# Patient Record
Sex: FEMALE | Race: WHITE | Employment: OTHER | URBAN - METROPOLITAN AREA
[De-identification: names, ages, dates, MRNs, and addresses within clinical notes are randomized per-mention and may not be internally consistent; named-entity substitution may affect disease eponyms.]

---

## 2019-02-08 ENCOUNTER — HOSPITAL ENCOUNTER (OUTPATIENT)
Age: 64
Setting detail: OUTPATIENT SURGERY
Discharge: HOME | End: 2019-02-08
Attending: INTERNAL MEDICINE | Admitting: INTERNAL MEDICINE
Payer: MEDICARE

## 2019-02-08 ENCOUNTER — ANESTHESIA EVENT (OUTPATIENT)
Dept: ENDOSCOPY | Age: 64
End: 2019-02-08
Payer: MEDICARE

## 2019-02-08 ENCOUNTER — ANESTHESIA (OUTPATIENT)
Dept: ENDOSCOPY | Age: 64
End: 2019-02-08
Payer: MEDICARE

## 2019-02-08 VITALS
TEMPERATURE: 96.8 F | HEART RATE: 74 BPM | OXYGEN SATURATION: 100 % | RESPIRATION RATE: 10 BRPM | SYSTOLIC BLOOD PRESSURE: 118 MMHG | DIASTOLIC BLOOD PRESSURE: 75 MMHG

## 2019-02-08 PROCEDURE — 74011250636 HC RX REV CODE- 250/636

## 2019-02-08 PROCEDURE — 76060000032 HC ANESTHESIA 0.5 TO 1 HR: Performed by: INTERNAL MEDICINE

## 2019-02-08 PROCEDURE — 76040000007: Performed by: INTERNAL MEDICINE

## 2019-02-08 PROCEDURE — 74011250637 HC RX REV CODE- 250/637: Performed by: INTERNAL MEDICINE

## 2019-02-08 PROCEDURE — 77030027957 HC TBNG IRR ENDOGTR BUSS -B: Performed by: INTERNAL MEDICINE

## 2019-02-08 RX ORDER — FENTANYL CITRATE 50 UG/ML
100 INJECTION, SOLUTION INTRAMUSCULAR; INTRAVENOUS
Status: DISCONTINUED | OUTPATIENT
Start: 2019-02-08 | End: 2019-02-08 | Stop reason: HOSPADM

## 2019-02-08 RX ORDER — MIDAZOLAM HYDROCHLORIDE 1 MG/ML
.25-1 INJECTION, SOLUTION INTRAMUSCULAR; INTRAVENOUS
Status: ACTIVE | OUTPATIENT
Start: 2019-02-08 | End: 2019-02-08

## 2019-02-08 RX ORDER — SODIUM CHLORIDE 0.9 % (FLUSH) 0.9 %
5-40 SYRINGE (ML) INJECTION AS NEEDED
Status: DISCONTINUED | OUTPATIENT
Start: 2019-02-08 | End: 2019-02-08 | Stop reason: HOSPADM

## 2019-02-08 RX ORDER — RANITIDINE 150 MG/1
150 TABLET, FILM COATED ORAL 2 TIMES DAILY
Status: ON HOLD | COMMUNITY
End: 2021-08-12

## 2019-02-08 RX ORDER — ROSUVASTATIN CALCIUM 10 MG/1
10 TABLET, COATED ORAL
COMMUNITY

## 2019-02-08 RX ORDER — DEXTROMETHORPHAN/PSEUDOEPHED 2.5-7.5/.8
1.2 DROPS ORAL
Status: DISCONTINUED | OUTPATIENT
Start: 2019-02-08 | End: 2019-02-08 | Stop reason: HOSPADM

## 2019-02-08 RX ORDER — ASPIRIN 81 MG/1
TABLET ORAL DAILY
COMMUNITY

## 2019-02-08 RX ORDER — LIDOCAINE HYDROCHLORIDE 20 MG/ML
INJECTION, SOLUTION EPIDURAL; INFILTRATION; INTRACAUDAL; PERINEURAL AS NEEDED
Status: DISCONTINUED | OUTPATIENT
Start: 2019-02-08 | End: 2019-02-08 | Stop reason: HOSPADM

## 2019-02-08 RX ORDER — SODIUM CHLORIDE 9 MG/ML
INJECTION, SOLUTION INTRAVENOUS
Status: DISCONTINUED | OUTPATIENT
Start: 2019-02-08 | End: 2019-02-08 | Stop reason: HOSPADM

## 2019-02-08 RX ORDER — ATROPINE SULFATE 0.1 MG/ML
0.5 INJECTION INTRAVENOUS
Status: DISCONTINUED | OUTPATIENT
Start: 2019-02-08 | End: 2019-02-08 | Stop reason: HOSPADM

## 2019-02-08 RX ORDER — NALOXONE HYDROCHLORIDE 0.4 MG/ML
0.4 INJECTION, SOLUTION INTRAMUSCULAR; INTRAVENOUS; SUBCUTANEOUS
Status: ACTIVE | OUTPATIENT
Start: 2019-02-08 | End: 2019-02-08

## 2019-02-08 RX ORDER — EPINEPHRINE 0.1 MG/ML
1 INJECTION INTRACARDIAC; INTRAVENOUS
Status: DISCONTINUED | OUTPATIENT
Start: 2019-02-08 | End: 2019-02-08 | Stop reason: HOSPADM

## 2019-02-08 RX ORDER — SODIUM CHLORIDE 9 MG/ML
50 INJECTION, SOLUTION INTRAVENOUS CONTINUOUS
Status: DISPENSED | OUTPATIENT
Start: 2019-02-08 | End: 2019-02-08

## 2019-02-08 RX ORDER — INDAPAMIDE 1.25 MG/1
1.25 TABLET, FILM COATED ORAL DAILY
COMMUNITY

## 2019-02-08 RX ORDER — PHENYLEPHRINE HCL IN 0.9% NACL 0.4MG/10ML
SYRINGE (ML) INTRAVENOUS AS NEEDED
Status: DISCONTINUED | OUTPATIENT
Start: 2019-02-08 | End: 2019-02-08 | Stop reason: HOSPADM

## 2019-02-08 RX ORDER — FLUMAZENIL 0.1 MG/ML
0.2 INJECTION INTRAVENOUS
Status: ACTIVE | OUTPATIENT
Start: 2019-02-08 | End: 2019-02-08

## 2019-02-08 RX ORDER — PROPOFOL 10 MG/ML
INJECTION, EMULSION INTRAVENOUS AS NEEDED
Status: DISCONTINUED | OUTPATIENT
Start: 2019-02-08 | End: 2019-02-08 | Stop reason: HOSPADM

## 2019-02-08 RX ORDER — SODIUM CHLORIDE 0.9 % (FLUSH) 0.9 %
5-40 SYRINGE (ML) INJECTION EVERY 8 HOURS
Status: DISCONTINUED | OUTPATIENT
Start: 2019-02-08 | End: 2019-02-08 | Stop reason: HOSPADM

## 2019-02-08 RX ORDER — METHOTREXATE 2.5 MG/1
2.5 TABLET ORAL
COMMUNITY

## 2019-02-08 RX ORDER — FOLIC ACID 1 MG/1
TABLET ORAL DAILY
COMMUNITY

## 2019-02-08 RX ADMIN — Medication 80 MCG: at 12:39

## 2019-02-08 RX ADMIN — PROPOFOL 50 MG: 10 INJECTION, EMULSION INTRAVENOUS at 12:18

## 2019-02-08 RX ADMIN — Medication 80 MCG: at 12:34

## 2019-02-08 RX ADMIN — PROPOFOL 50 MG: 10 INJECTION, EMULSION INTRAVENOUS at 12:22

## 2019-02-08 RX ADMIN — LIDOCAINE HYDROCHLORIDE 60 MG: 20 INJECTION, SOLUTION EPIDURAL; INFILTRATION; INTRACAUDAL; PERINEURAL at 12:09

## 2019-02-08 RX ADMIN — SODIUM CHLORIDE: 9 INJECTION, SOLUTION INTRAVENOUS at 12:06

## 2019-02-08 RX ADMIN — PROPOFOL 100 MG: 10 INJECTION, EMULSION INTRAVENOUS at 12:14

## 2019-02-08 RX ADMIN — PROPOFOL 20 MG: 10 INJECTION, EMULSION INTRAVENOUS at 12:39

## 2019-02-08 RX ADMIN — PROPOFOL 40 MG: 10 INJECTION, EMULSION INTRAVENOUS at 12:37

## 2019-02-08 RX ADMIN — PROPOFOL 50 MG: 10 INJECTION, EMULSION INTRAVENOUS at 12:07

## 2019-02-08 RX ADMIN — PROPOFOL 50 MG: 10 INJECTION, EMULSION INTRAVENOUS at 12:27

## 2019-02-08 RX ADMIN — PROPOFOL 30 MG: 10 INJECTION, EMULSION INTRAVENOUS at 12:32

## 2019-02-08 RX ADMIN — PROPOFOL 50 MG: 10 INJECTION, EMULSION INTRAVENOUS at 12:20

## 2019-02-08 RX ADMIN — PROPOFOL 30 MG: 10 INJECTION, EMULSION INTRAVENOUS at 12:25

## 2019-02-08 RX ADMIN — PROPOFOL 50 MG: 10 INJECTION, EMULSION INTRAVENOUS at 12:10

## 2019-02-08 RX ADMIN — PROPOFOL 30 MG: 10 INJECTION, EMULSION INTRAVENOUS at 12:33

## 2019-02-08 NOTE — PROGRESS NOTES

## 2019-02-08 NOTE — DISCHARGE INSTRUCTIONS
118 [facility].  [address]astelum [facility]  [id number]  1955    It was my pleasure seeing you for your procedure. You will also receive a summary report with the findings from this procedure and any further recommendations. If you had polyps removed or biopsies taken during your procedure, you will receive a separate letter from me within the next 2 weeks. If you don't receive this letter or if you have any questions, please call my office [phone]. Please take note of the post procedure instructions listed below. Mark Anthony Lynch,    Dr. Mari Contreras    These instructions give you information on caring for yourself after your procedure. Call your doctor if you have any problems or questions after your procedure. HOME CARE  · Walk if you have belly cramping or gas. Walking will help get rid of the air and reduce the bloated feeling in your belly (abdomen). · Your IV site (where you received drugs) may be tender to touch. Place warm towels on the site; keep your arm up on two pillows if you have any swelling or soreness in the area. · You may shower. ACTIVITY:  · Take frequent rest periods and move at a slower pace for the next 24 hours. .  · You may resume your regular activity tomorrow if you are feeling back to normal.  · Do not drive or ride a bicycle for at least 24 hours (because of the medicine (anesthesia) used during the test). · Do not sign any important legal documents or use or operate any machinery for 24 hours  · Do not take sleeping medicines/nerve drugs for 24 hours unless the doctor tells you. · You can return to work/school tomorrow unless otherwise instructed. NUTRITION:  · Drink plenty of fluids to keep your pee (urine) clear or pale yellow  · Begin with a light meal and progress to your normal diet.  Heavy or fried foods are harder to digest and may make you feel sick to your stomach (nauseated). · Once you are feeling back to normal, you may resume your normal diet as instructed by your doctor. · Avoid alcoholic beverages for 24 hours or as instructed. IF YOU HAD BIOPSIES TAKEN OR POLYPS REMOVED DURING THE PROCEDURE:  · For the next 7 days, avoid all non-steroidal antiinflammatory medications such as Ibuprofen, Motrin, Advil, Alleve, Marlene-seltzer, Goody's powder, BC powder. · If you do not have an heart condition that requires you to take a daily aspirin, you should avoid taking aspirin for 7 days. · Eat a soft diet for 24 hours. · Monitor your stools for any blood or dark black, tar-like, stools as this may be a sign of bleeding and if you see any blood, notify your doctor immediately. GET HELP RIGHT AWAY AND SEEK IMMEDIATE MEDICAL CARE IF:  · You have more than a spotting of blood in your stool. · You pass clumps of tissue (blood clots) or fill the toilet with blood. · Your belly is painfully swollen or puffy (abdominal distention). · You throw up (vomit). · You have a fever. · You have redness, pain or swelling at the IV site that last greater than two days. · You have abdominal pain or discomfort that is severe or gets worse throughout the day. Post-procedure diagnosis:    1. Grade C Esophagitis   2. Hiatal Hernia  3. Mild Sigmoid Diverticulosis    EGD Findings:  Esophagus:Grade C esophagitis in the distal esophagus with associated small non-obstructing ring at the GE junction (38 cm from the incisors), 2 cm sliding hiatal hernia  Stomach:normal   Duodenum/jejunum:normal    Colon Findings:   Rectum: normal  Sigmoid: few small diverticula, slightly tortous, overcome with manual pressure  Descending Colon: normal  Transverse Colon: normal  Ascending Colon: normal  Cecum: normal    Recommendations:   - Resume normal medications.  Stop ranitidine and start omeprazole 40 mg twice daily prior to meals.   - Repeat EGD in 12 weeks to assess healing.   - Recommend repeat colonoscopy in 5 years assuming no personal history of prior polyps. Patient Education   Patient Education        Hiatal Hernia: Care Instructions  Your Care Instructions  A hiatal hernia occurs when part of the stomach bulges into the chest cavity. A hiatal hernia may allow stomach acid and juices to back up into the esophagus (acid reflux). This can cause a feeling of burning, warmth, heat, or pain behind the breastbone. This feeling may often occur after you eat, soon after you lie down, or when you bend forward, and it may come and go. You also may have a sour taste in your mouth. These symptoms are commonly known as heartburn or reflux. But not all hiatal hernias cause symptoms. Follow-up care is a key part of your treatment and safety. Be sure to make and go to all appointments, and call your doctor if you are having problems. It's also a good idea to know your test results and keep a list of the medicines you take. How can you care for yourself at home? · Take your medicines exactly as prescribed. Call your doctor if you think you are having a problem with your medicine. · Do not take aspirin or other nonsteroidal anti-inflammatory drugs (NSAIDs), such as ibuprofen (Advil, Motrin) or naproxen (Aleve), unless your doctor says it is okay. Ask your doctor what you can take for pain. · Your doctor may recommend over-the-counter medicine. For mild or occasional indigestion, antacids such as Tums, Gaviscon, Maalox, or Mylanta may help. Your doctor also may recommend over-the-counter acid reducers, such as famotidine (Pepcid AC), cimetidine (Tagamet HB), ranitidine (Zantac 75 and Zantac 150), or omeprazole (Prilosec). Read and follow all instructions on the label. If you use these medicines often, talk with your doctor. · Change your eating habits. ? It's best to eat several small meals instead of two or three large meals. ?  After you eat, wait 2 to 3 hours before you lie down. Late-night snacks aren't a good idea. ? Chocolate, mint, and alcohol can make heartburn worse. They relax the valve between the esophagus and the stomach. ? Spicy foods, foods that have a lot of acid (like tomatoes and oranges), and coffee can make heartburn symptoms worse in some people. If your symptoms are worse after you eat a certain food, you may want to stop eating that food to see if your symptoms get better. · Do not smoke or chew tobacco.  · If you get heartburn at night, raise the head of your bed 6 to 8 inches by putting the frame on blocks or placing a foam wedge under the head of your mattress. (Adding extra pillows does not work.)  · Do not wear tight clothing around your middle. · Lose weight if you need to. Losing just 5 to 10 pounds can help. When should you call for help? Call your doctor now or seek immediate medical care if:    · You have new or worse belly pain.     · You are vomiting.    Watch closely for changes in your health, and be sure to contact your doctor if:    · You have new or worse symptoms of indigestion.     · You have trouble or pain swallowing.     · You are losing weight.     · You do not get better as expected. Where can you learn more? Go to http://amaya-crissy.info/. Enter L592 in the search box to learn more about \"Hiatal Hernia: Care Instructions. \"  Current as of: March 27, 2018  Content Version: 11.9  © 2636-6723 [email or web address]. Care instructions adapted under license by [employer] (which disclaims liability or warranty for this information). If you have questions about a medical condition or this instruction, always ask your healthcare professional. [address] any warranty or liability for your use of this information. Diverticulosis: Care Instructions  Your Care Instructions  In diverticulosis, pouches called diverticula form in the wall of the large intestine (colon). The pouches do not cause any pain or other symptoms. Most people who have diverticulosis do not know they have it. But the pouches sometimes bleed, and if they become infected, they can cause pain and other symptoms. When this happens, it is called diverticulitis. Diverticula form when pressure pushes the wall of the colon outward at certain weak points. A diet that is too low in fiber can cause diverticula. Follow-up care is a key part of your treatment and safety. Be sure to make and go to all appointments, and call your doctor if you are having problems. It's also a good idea to know your test results and keep a list of the medicines you take. How can you care for yourself at home? · Include fruits, leafy green vegetables, beans, and whole grains in your diet each day. These foods are high in fiber. · Take a fiber supplement, such as Citrucel or Metamucil, every day if needed. Read and follow all instructions on the label. · Drink plenty of fluids, enough so that your urine is light yellow or clear like water. If you have kidney, heart, or liver disease and have to limit fluids, talk with your doctor before you increase the amount of fluids you drink. · Get at least 30 minutes of exercise on most days of the week. Walking is a good choice. You also may want to do other activities, such as running, swimming, cycling, or playing tennis or team sports. · Cut out foods that cause gas, pain, or other symptoms. When should you call for help?   Call your doctor now or seek immediate medical care if:    · You have belly pain.     · You pass maroon or very bloody stools.     · You have a fever.     · You have nausea and vomiting.     · You have unusual changes in your bowel movements or abdominal swelling.     · You have burning pain when you urinate.     · You have abnormal vaginal discharge.     · You have shoulder pain.     · You have cramping pain that does not get better when you have a bowel movement or pass gas.     · You pass gas or stool from your urethra while urinating.    Watch closely for changes in your health, and be sure to contact your doctor if you have any problems. Where can you learn more? Go to http://amaya-crissy.info/. Enter Y096 in the search box to learn more about \"Diverticulosis: Care Instructions. \"  Current as of: March 27, 2018  Content Version: 11.9  © 2784-5977 [employer]. Care instructions adapted under license by [employer] (which disclaims liability or warranty for this information). If you have questions about a medical condition or this instruction, always ask your healthcare professional. [address] any warranty or liability for your use of this information.

## 2019-02-08 NOTE — ANESTHESIA PREPROCEDURE EVALUATION
Anesthetic History No history of anesthetic complications Review of Systems / Medical History Patient summary reviewed, nursing notes reviewed and pertinent labs reviewed Pulmonary Within defined limits Neuro/Psych Within defined limits Cardiovascular Within defined limits Hypertension GI/Hepatic/Renal 
Within defined limits GERD Endo/Other Within defined limits Arthritis Other Findings Comments: Raynauds Physical Exam 
 
Airway Mallampati: II 
TM Distance: > 6 cm Neck ROM: normal range of motion Mouth opening: Normal 
 
 Cardiovascular Regular rate and rhythm,  S1 and S2 normal,  no murmur, click, rub, or gallop Dental 
No notable dental hx Pulmonary Breath sounds clear to auscultation Abdominal 
GI exam deferred Other Findings Anesthetic Plan ASA: 2 Anesthesia type: MAC Anesthetic plan and risks discussed with: Patient

## 2019-02-08 NOTE — H&P
[address] [facility] [address]  [address]  [place], [address]  [phone]                                History and Physical     NAME: Lena Tripp   :  1955   MRN:  [id number]     HPI:  The patient was seen and examined. No past surgical history on file. No past medical history on file. Social History     Tobacco Use    Smoking status: Not on file   Substance Use Topics    Alcohol use: Not on file    Drug use: Not on file     Not on File  No family history on file. No current facility-administered medications for this encounter. PHYSICAL EXAM:  General: WD, WN. Alert, cooperative, no acute distress    HEENT: NC, Atraumatic. PERRLA, EOMI. Anicteric sclerae. Lungs:  CTA Bilaterally. No Wheezing/Rhonchi/Rales. Heart:  Regular  rhythm,  No murmur, No Rubs, No Gallops  Abdomen: Soft, Non distended, Non tender.  +Bowel sounds, no HSM  Extremities: No c/c/e  Neurologic:  CN 2-12 gi, Alert and oriented X 3. No acute neurological distress   Psych:   Good insight. Not anxious nor agitated. The heart, lungs and mental status were satisfactory for the administration of MAC sedation and for the procedure.       Mallampati score: 2       Assessment:   · GERD, screening colonoscopy    Plan:   · Endoscopic procedure : egd w bravo, cscope  · MAC sedation

## 2019-02-08 NOTE — ROUTINE PROCESS
Chastity Brenda  1955  [id number]    Situation:  Verbal report received from: Sita  Procedure: Procedure(s):  ESOPHAGOGASTRODUODENOSCOPY (EGD)  COLONOSCOPY    Background:    Preoperative diagnosis: Reflux  Screening  Postoperative diagnosis: 1. Grade C Esophagitis   2. Hiatal Hernia  3. Mild Sigmoid Diverticulosis    :  Dr. Isis Sol  Assistant(s): Endoscopy Technician-1: Jordan Antonio  Endoscopy RN-1: Arthea Castleman, RN    Specimens: * No specimens in log *  H. Pylori  no    Assessment:  Intra-procedure medications     Anesthesia gave intra-procedure sedation and medications, see anesthesia flow sheet yes    Intravenous fluids: NS@ KVO     Vital signs stable yes    Abdominal assessment: round and soft yes    Recommendation:  Discharge patient per MD order yes .   Return to floor na   Family or Friend fam  Permission to share finding with family or friend yes

## 2019-02-08 NOTE — ANESTHESIA POSTPROCEDURE EVALUATION
Procedure(s): ESOPHAGOGASTRODUODENOSCOPY (EGD) COLONOSCOPY. Anesthesia Post Evaluation Multimodal analgesia: multimodal analgesia not used between 6 hours prior to anesthesia start to PACU discharge Patient location during evaluation: PACU Patient participation: complete - patient participated Level of consciousness: awake Pain score: 0 Pain management: adequate Airway patency: patent Anesthetic complications: no 
Cardiovascular status: acceptable Respiratory status: acceptable Hydration status: acceptable Comments: I have evaluated the patient and meets criteria for discharge from PACU. Skye Patel MD 
 
 
 
Visit Vitals BP 96/63 Pulse 74 Temp 36 °C (96.8 °F) Resp 11 SpO2 100%

## 2019-02-08 NOTE — PROCEDURES
118 [facility].  217 [facility] [address], [address]   [phone]                           Colonoscopy and EGD Procedure Note      Indications:  GERD, colon cancer screening (last colonoscopy > 5 years prior)     :  Jose Juan Norwood MD    Referring Provider: Alexandria Mckinley MD    Sedation:  MAC anesthesia    Procedure Details:  After informed consent was obtained with all risks and benefits of procedure explained and pre-operative exam completed, pt was placed in the left lateral decubitus position. Following sequential administration of sedation as per above, the gastroscope was inserted into the mouth and advanced under direct vision to second portion of the duodenum. A careful inspection was made as the gastroscope was withdrawn, including a retroflexed view of the proximal stomach; findings and interventions are described below. EGD Findings:  Esophagus:Grade C esophagitis in the distal esophagus with associated small non-obstructing ring at the GE junction (38 cm from the incisors), 2 cm sliding hiatal hernia  Stomach:normal   Duodenum/jejunum:normal    EGD Interventions:  none    The bed was then turned and upon sequential sedation as per above, a digital rectal exam was performed per below. The Olympus videocolonoscope was inserted in the rectum and carefully advanced to the cecum, which was identified by the ileocecal valve and appendiceal orifice. The quality of preparation was good. [place] Bowel Prep Score  3/3/3. The colonoscope was slowly withdrawn with careful evaluation between folds. Retroflexion in the rectum was performed. Colon Findings:   Rectum: normal  Sigmoid: few small diverticula, slightly tortous, overcome with manual pressure  Descending Colon: normal  Transverse Colon: normal  Ascending Colon: normal  Cecum: normal    Colonoscopy Interventions:  none           Specimens Removed:  * No specimens in log *    Complications: None.      EBL: None    Impression:    See Postoperative diagnosis above    Recommendations:   - Resume normal medications. Stop ranitidine and start omeprazole 40 mg twice daily prior to meals.   - Noting degree of esophagitis, defer bravo placement.   - Repeat EGD in 12 weeks to assess healing.   - Recommend repeat colonoscopy in 10 years assuming no personal history of prior polyps. Discharge Disposition:  Home in the company of a  when able to ambulate.     Bonita Mendez MD  2/8/2019  12:51 PM

## 2019-04-29 ENCOUNTER — ANESTHESIA (OUTPATIENT)
Dept: ENDOSCOPY | Age: 64
End: 2019-04-29
Payer: MEDICARE

## 2019-04-29 ENCOUNTER — HOSPITAL ENCOUNTER (OUTPATIENT)
Age: 64
Setting detail: OUTPATIENT SURGERY
Discharge: HOME | End: 2019-04-29
Attending: INTERNAL MEDICINE | Admitting: INTERNAL MEDICINE
Payer: MEDICARE

## 2019-04-29 ENCOUNTER — ANESTHESIA EVENT (OUTPATIENT)
Dept: ENDOSCOPY | Age: 64
End: 2019-04-29
Payer: MEDICARE

## 2019-04-29 VITALS
OXYGEN SATURATION: 96 % | DIASTOLIC BLOOD PRESSURE: 81 MMHG | TEMPERATURE: 98 F | RESPIRATION RATE: 16 BRPM | HEART RATE: 80 BPM | WEIGHT: 150 LBS | SYSTOLIC BLOOD PRESSURE: 112 MMHG

## 2019-04-29 PROCEDURE — 74011250636 HC RX REV CODE- 250/636

## 2019-04-29 PROCEDURE — 76060000031 HC ANESTHESIA FIRST 0.5 HR: Performed by: INTERNAL MEDICINE

## 2019-04-29 PROCEDURE — 76040000019: Performed by: INTERNAL MEDICINE

## 2019-04-29 RX ORDER — OMEPRAZOLE 40 MG/1
40 CAPSULE, DELAYED RELEASE ORAL 2 TIMES DAILY
Status: ON HOLD | COMMUNITY
End: 2021-09-13

## 2019-04-29 RX ORDER — ATROPINE SULFATE 0.1 MG/ML
0.5 INJECTION INTRAVENOUS
Status: DISCONTINUED | OUTPATIENT
Start: 2019-04-29 | End: 2019-04-29 | Stop reason: HOSPADM

## 2019-04-29 RX ORDER — PROPOFOL 10 MG/ML
INJECTION, EMULSION INTRAVENOUS AS NEEDED
Status: DISCONTINUED | OUTPATIENT
Start: 2019-04-29 | End: 2019-04-29 | Stop reason: HOSPADM

## 2019-04-29 RX ORDER — LIDOCAINE HYDROCHLORIDE 20 MG/ML
INJECTION, SOLUTION EPIDURAL; INFILTRATION; INTRACAUDAL; PERINEURAL AS NEEDED
Status: DISCONTINUED | OUTPATIENT
Start: 2019-04-29 | End: 2019-04-29 | Stop reason: HOSPADM

## 2019-04-29 RX ORDER — FLUMAZENIL 0.1 MG/ML
0.2 INJECTION INTRAVENOUS
Status: DISCONTINUED | OUTPATIENT
Start: 2019-04-29 | End: 2019-04-29 | Stop reason: HOSPADM

## 2019-04-29 RX ORDER — DEXTROMETHORPHAN/PSEUDOEPHED 2.5-7.5/.8
1.2 DROPS ORAL
Status: DISCONTINUED | OUTPATIENT
Start: 2019-04-29 | End: 2019-04-29 | Stop reason: HOSPADM

## 2019-04-29 RX ORDER — MIDAZOLAM HYDROCHLORIDE 1 MG/ML
.25-1 INJECTION, SOLUTION INTRAMUSCULAR; INTRAVENOUS
Status: DISCONTINUED | OUTPATIENT
Start: 2019-04-29 | End: 2019-04-29 | Stop reason: HOSPADM

## 2019-04-29 RX ORDER — FENTANYL CITRATE 50 UG/ML
100 INJECTION, SOLUTION INTRAMUSCULAR; INTRAVENOUS
Status: DISCONTINUED | OUTPATIENT
Start: 2019-04-29 | End: 2019-04-29 | Stop reason: HOSPADM

## 2019-04-29 RX ORDER — EPINEPHRINE 0.1 MG/ML
1 INJECTION INTRACARDIAC; INTRAVENOUS
Status: DISCONTINUED | OUTPATIENT
Start: 2019-04-29 | End: 2019-04-29 | Stop reason: HOSPADM

## 2019-04-29 RX ORDER — SODIUM CHLORIDE 9 MG/ML
INJECTION, SOLUTION INTRAVENOUS
Status: DISCONTINUED | OUTPATIENT
Start: 2019-04-29 | End: 2019-04-29 | Stop reason: HOSPADM

## 2019-04-29 RX ORDER — SODIUM CHLORIDE 9 MG/ML
50 INJECTION, SOLUTION INTRAVENOUS CONTINUOUS
Status: DISCONTINUED | OUTPATIENT
Start: 2019-04-29 | End: 2019-04-29 | Stop reason: HOSPADM

## 2019-04-29 RX ORDER — SODIUM CHLORIDE 0.9 % (FLUSH) 0.9 %
5-40 SYRINGE (ML) INJECTION AS NEEDED
Status: DISCONTINUED | OUTPATIENT
Start: 2019-04-29 | End: 2019-04-29 | Stop reason: HOSPADM

## 2019-04-29 RX ORDER — SODIUM CHLORIDE 0.9 % (FLUSH) 0.9 %
5-40 SYRINGE (ML) INJECTION EVERY 8 HOURS
Status: DISCONTINUED | OUTPATIENT
Start: 2019-04-29 | End: 2019-04-29 | Stop reason: HOSPADM

## 2019-04-29 RX ORDER — NALOXONE HYDROCHLORIDE 0.4 MG/ML
0.4 INJECTION, SOLUTION INTRAMUSCULAR; INTRAVENOUS; SUBCUTANEOUS
Status: DISCONTINUED | OUTPATIENT
Start: 2019-04-29 | End: 2019-04-29 | Stop reason: HOSPADM

## 2019-04-29 RX ADMIN — SODIUM CHLORIDE: 9 INJECTION, SOLUTION INTRAVENOUS at 07:35

## 2019-04-29 RX ADMIN — PROPOFOL 50 MG: 10 INJECTION, EMULSION INTRAVENOUS at 07:45

## 2019-04-29 RX ADMIN — LIDOCAINE HYDROCHLORIDE 40 MG: 20 INJECTION, SOLUTION EPIDURAL; INFILTRATION; INTRACAUDAL; PERINEURAL at 07:41

## 2019-04-29 RX ADMIN — PROPOFOL 50 MG: 10 INJECTION, EMULSION INTRAVENOUS at 07:43

## 2019-04-29 RX ADMIN — PROPOFOL 100 MG: 10 INJECTION, EMULSION INTRAVENOUS at 07:41

## 2019-04-29 NOTE — PROCEDURES
118 [facility]3].  217 [facility] [address], [address]  [phone]                            NAME:  Rhona Blackwell   :   1955   MRN:   [id number]     Date/Time:  2019 7:50 AM    Esophagogastroduodenoscopy (EGD) Procedure Note    :  Rosey Coleman MD    Referring Provider:  Girish Pike MD    Anethesia/Sedation:  MAC anesthesia    Procedure Details   After infomed consent was obtained for the procedure, with all risks and benefits of procedure explained the patient was taken to the endoscopy suite and placed in the left lateral decubitus position. Following sequential administration of sedation as per above, the gastroscope was inserted into the mouth and advanced under direct vision to second portion of the duodenum. A careful inspection was made as the gastroscope was withdrawn, including a retroflexed view of the proximal stomach; findings and interventions are described below. Findings:  Esophagus:normal esophageal mucosa, Z-line 35 cm from the incisors. Esophagitis is fully healed with healthy appearing underlying mucosa. 3 cm sliding hiatal hernia, 35-38 cm from the incisors. Stomach:normal   Duodenum/jejunum:normal    Interventions:  none           Specimens Removed:  * No specimens in log *    Complications: None. EBL:  None    Impression:    See Postoperative diagnosis above    Recommendations:   - Resume normal medications.  Will decrease omeprazole : will send in prescription for 20 mg BID, though recommend initial trial of omeprazole 20 mg once daily and monitor for symptoms    Discharge disposition:  Home in the company of  when able to ambulate    Rosey Coleman MD

## 2019-04-29 NOTE — PROGRESS NOTES

## 2019-04-29 NOTE — DISCHARGE INSTRUCTIONS
[address] [facility] [address]  [address]  [address] [place] [address] [address] Gastelum [facility]  [id number]  1955    It was my pleasure seeing you for your procedure. You will also receive a summary report with the findings from this procedure and any further recommendations. If you had polyps removed or biopsies taken during your procedure, you will receive a separate letter from me within the next 2 weeks. If you don't receive this letter or if you have any questions, please call my office [phone]. Please take note of the post procedure instructions listed below. Mark Anthony Lynch,    Dr. Juliane Youngblood    These instructions give you information on caring for yourself after your procedure. Call your doctor if you have any problems or questions after your procedure. HOME CARE  · Walk if you have belly cramping or gas. Walking will help get rid of the air and reduce the bloated feeling in your belly (abdomen). · Your IV site (where you received drugs) may be tender to touch. Place warm towels on the site; keep your arm up on two pillows if you have any swelling or soreness in the area. · You may shower. ACTIVITY:  · Take frequent rest periods and move at a slower pace for the next 24 hours. .  · You may resume your regular activity tomorrow if you are feeling back to normal.  · Do not drive or ride a bicycle for at least 24 hours (because of the medicine (anesthesia) used during the test). · Do not sign any important legal documents or use or operate any machinery for 24 hours  · Do not take sleeping medicines/nerve drugs for 24 hours unless the doctor tells you. · You can return to work/school tomorrow unless otherwise instructed. NUTRITION:  · Drink plenty of fluids to keep your pee (urine) clear or pale yellow  · Begin with a light meal and progress to your normal diet.  Heavy or fried foods are harder to digest and may make you feel sick to your stomach (nauseated). · Once you are feeling back to normal, you may resume your normal diet as instructed by your doctor. · Avoid alcoholic beverages for 24 hours or as instructed. IF YOU HAD BIOPSIES TAKEN OR POLYPS REMOVED DURING THE PROCEDURE:  · For the next 7 days, avoid all non-steroidal antiinflammatory medications such as Ibuprofen, Motrin, Advil, Alleve, Marlene-seltzer, Goody's powder, BC powder. · If you do not have an heart condition that requires you to take a daily aspirin, you should avoid taking aspirin for 7 days. · Eat a soft diet for 24 hours. · Monitor your stools for any blood or dark black, tar-like, stools as this may be a sign of bleeding and if you see any blood, notify your doctor immediately. GET HELP RIGHT AWAY AND SEEK IMMEDIATE MEDICAL CARE IF:  · You have more than a spotting of blood in your stool. · You pass clumps of tissue (blood clots) or fill the toilet with blood. · Your belly is painfully swollen or puffy (abdominal distention). · You throw up (vomit). · You have a fever. · You have redness, pain or swelling at the IV site that last greater than two days. · You have abdominal pain or discomfort that is severe or gets worse throughout the day. Post-procedure diagnosis:  hiatal hernia    Findings:  Esophagus:normal esophageal mucosa, Z-line 35 cm from the incisors. Esophagitis is fully healed with healthy appearing underlying mucosa. 3 cm sliding hiatal hernia, 35-38 cm from the incisors. Stomach:normal   Duodenum/jejunum:normal    Recommendations:   - Resume normal medications.  Will decrease omeprazole : will send in prescription for 20 mg BID, though recommend initial trial of omeprazole 20 mg once daily and monitor for symptoms

## 2019-04-29 NOTE — ANESTHESIA POSTPROCEDURE EVALUATION
Post-Anesthesia Evaluation and Assessment Patient: Moon Camilo MRN: [id number]  SSN: [id number] YOB: 1955  Age: 70 y.o. Sex: female I have evaluated the patient and they are stable and ready for discharge from the PACU. Cardiovascular Function/Vital Signs Visit Vitals /81 Pulse 80 Temp 36.7 °C (98 °F) Resp 16 Wt 68 kg (150 lb) SpO2 96% Patient is status post MAC anesthesia for Procedure(s): ESOPHAGOGASTRODUODENOSCOPY (EGD). Nausea/Vomiting: None Postoperative hydration reviewed and adequate. Pain: 
Pain Scale 1: Numeric (0 - 10) (04/29/19 0817) Pain Intensity 1: 0 (04/29/19 0817) Managed Neurological Status: At baseline Mental Status, Level of Consciousness: Alert and  oriented to person, place, and time Pulmonary Status:  
O2 Device: Room air (04/29/19 0817) Adequate oxygenation and airway patent Complications related to anesthesia: None Post-anesthesia assessment completed. No concerns Signed By: Sahra Villegas MD   
 April 29, 2019 Procedure(s): ESOPHAGOGASTRODUODENOSCOPY (EGD). MAC 
 
<BSHSIANPOST> Vitals Value Taken Time /81 4/29/2019  8:17 AM  
Temp 36.7 °C (98 °F) 4/29/2019  8:04 AM  
Pulse 87 4/29/2019  8:23 AM  
Resp 0 4/29/2019  8:49 AM  
SpO2 100 % 4/29/2019  8:23 AM  
Vitals shown include unvalidated device data.

## 2019-04-29 NOTE — H&P
118 [facility].  217 [address], [address]  [phone]                                History and Physical     NAME: Yomaira Whitaker   :  1955   MRN:  [id number]     HPI:  The patient was seen and examined. Past Surgical History:   Procedure Laterality Date    COLONOSCOPY N/A 2019    COLONOSCOPY performed by Betsy Mccoy MD at [facility] ENDOSCOPY     Past Medical History:   Diagnosis Date    GERD (gastroesophageal reflux disease)     Hyperlipidemia     Hypertension     Lema [facility] ([facility] [address] [facility]     Rheumatoid arthritis ([facility] [address] [facility]      Social History     Tobacco Use    Smoking status: Never Smoker    Smokeless tobacco: Never Used   Substance Use Topics    Alcohol use: Yes     Alcohol/week: 0.6 oz     Types: 1 Glasses of wine per week     Frequency: Never    Drug use: No     No Known Allergies  History reviewed. No pertinent family history. No current facility-administered medications for this encounter. PHYSICAL EXAM:  General: WD, WN. Alert, cooperative, no acute distress    HEENT: NC, Atraumatic. PERRLA, EOMI. Anicteric sclerae. Lungs:  CTA Bilaterally. No Wheezing/Rhonchi/Rales. Heart:  Regular  rhythm,  No murmur, No Rubs, No Gallops  Abdomen: Soft, Non distended, Non tender.  +Bowel sounds, no HSM  Extremities: No c/c/e  Neurologic:  CN 2-12 gi, Alert and oriented X 3. No acute neurological distress   Psych:   Good insight. Not anxious nor agitated. The heart, lungs and mental status were satisfactory for the administration of MAC sedation and for the procedure.       Mallampati score: 2       Assessment:   · Follow up esophagitis    Plan:   · Endoscopic procedure :egd  · MAC sedation

## 2021-08-03 NOTE — PERIOP NOTES
Patient denied any COVID-19 symptoms or possible exposure that would require a pre-procedural COVID-19 test for the Endoscopy procedure scheduled on 8/12/21. Pt states she has been fully vaccinated (>2 weeks) and will bring card on 8/12/21.

## 2021-08-12 ENCOUNTER — HOSPITAL ENCOUNTER (OUTPATIENT)
Age: 66
Setting detail: OUTPATIENT SURGERY
Discharge: HOME | End: 2021-08-12
Attending: INTERNAL MEDICINE | Admitting: INTERNAL MEDICINE
Payer: MEDICARE

## 2021-08-12 VITALS
SYSTOLIC BLOOD PRESSURE: 176 MMHG | DIASTOLIC BLOOD PRESSURE: 97 MMHG | HEART RATE: 91 BPM | OXYGEN SATURATION: 100 % | BODY MASS INDEX: 24.21 KG/M2 | RESPIRATION RATE: 16 BRPM | HEIGHT: 66 IN

## 2021-08-12 PROCEDURE — 77030007009 HC CATH PH VRSFLX ALPN -C: Performed by: INTERNAL MEDICINE

## 2021-08-12 PROCEDURE — 2709999900 HC NON-CHARGEABLE SUPPLY: Performed by: INTERNAL MEDICINE

## 2021-08-12 PROCEDURE — 76040000008: Performed by: INTERNAL MEDICINE

## 2021-08-12 RX ORDER — CHOLECALCIFEROL (VITAMIN D3) 50 MCG
CAPSULE ORAL
COMMUNITY

## 2021-08-12 RX ORDER — LIDOCAINE HYDROCHLORIDE 20 MG/ML
JELLY TOPICAL ONCE
Status: DISCONTINUED | OUTPATIENT
Start: 2021-08-12 | End: 2021-08-12 | Stop reason: HOSPADM

## 2021-08-12 RX ORDER — ZINC GLUCONATE 10 MG
LOZENGE ORAL
COMMUNITY

## 2021-08-12 NOTE — DISCHARGE INSTRUCTIONS
Karis Ulloa  [id number]  1955      MANOMETRY DISCHARGE INSTRUCTION    You may resume your regular diet as tolerated. You may resume your normal daily activities. If you develop a sore throat- throat lozenges or warm salt water gargles will help. Call your Physician if you have any complications or questions. Karis Ulloa  [id number]  1955    24 HOUR PH MONITORING DISCHARGE INSTRUCTIONS    Please return to [facility] Endoscopy department at 11:00 tomorrow with your completed diary. ACTIVITY:  Avoid any activity that may get the data recorder wet. You may resume your normal daily activities. DIET:  You may resume your normal diet, HOWEVER avoid peanut butter and carbonated beverages for the next 24 hours. MEDICATIONS:  You may resume your normal medications with the exception of antacids    PLEASE Alon Zhang RN  AT  Office [phone] OR Pager [phone] IF YOU HAVE QUESTIONS, CONCERNS, OR TECHNICAL DIFFICULTIES DURING YOUR TEST. [employer] Activation    Thank you for requesting access to [employer]. Please follow the instructions below to securely access and download your online medical record. [employer] allows you to send messages to your doctor, view your test results, renew your prescriptions, schedule appointments, and more. How Do I Sign Up? 1. In your internet browser, go to [email or web address]  2. Click on the First Time User? Click Here link in the Sign In box. You will be redirect to the New Member Sign Up page. 3. Enter your [employer] Access Code exactly as it appears below. You will not need to use this code after youve completed the sign-up process. If you do not sign up before the expiration date, you must request a new code. [employer] Access Code: [email or web address]  Expires: 9/10/2021  9:57 AM (This is the date your [employer] access code will )    4.  Enter the last four digits of your Social Security Number (xxxx) and Date of Birth (aris/lino/yyyy) as indicated and click Submit. You will be taken to the next sign-up page. 5. Create a [employer] ID. This will be your [employer] login ID and cannot be changed, so think of one that is secure and easy to remember. 6. Create a [employer] password. You can change your password at any time. 7. Enter your Password Reset Question and Answer. This can be used at a later time if you forget your password. 8. Enter your e-mail address. You will receive e-mail notification when new information is available in [address] 9. Click Sign Up. You can now view and download portions of your medical record. 10. Click the Download Summary menu link to download a portable copy of your medical information. Additional Information    If you have questions, please visit the Frequently Asked Questions section of the [employer] website at [email or web address] Remember, [employer] is NOT to be used for urgent needs. For medical emergencies, dial 911.

## 2021-08-12 NOTE — PROGRESS NOTES
5cc viscous lidocaine inhaled into bilateral nare per MD orders. Probe inserted into  bilateral nare without difficulty. Pt tolerated procedure well. PH inserted into right nare 5 cms proximal to the LES without difficulty. Pt tolerated well. Data recorder activated and recording. Pt given diary and instructions for use of recorder as well as contact information for assistance as needed.

## 2021-08-12 NOTE — PROGRESS NOTES
Patient hypertensive. States BP at home was 108/76. Will continue with procedure, monitor patient, and recheck BP post-procedure.

## 2021-08-23 NOTE — PROCEDURES
Esophageal High-Resolution Manometry Report Summary     Date HRM Performed: 8/12/21  Referring physician: Self  Indication: GERD, esophagitis   ? PROCEDURE:  A solid-state recording assembly comprised of 36 circumferential pressure sensors spaced at 1 cm intervals was placed transnasally into the esophagus and positioned through the EGJ. The patient was positioned in the supine position. Mean EGJ junction pressures were measured during a 30-second baseline recording during which the patient was instructed to minimize swallowing. Contractility and pressurization pattern was assessed during ten 5-ml water swallows in the supine position at least 20-30 seconds apart, to generate the [facility] Classification diagnosis. Moreover, multiple rapid swallows (5 2-mL water swallows <3 seconds apart) were performed. ? RESULTS:   Assembly traversed diaphragm? Yes   Location of proximal border of LES: 42.7 cm  EGJ morphology: Type II   LES-CD separation: 2 cm   End-expiratory LESP: 12.9 mm Hg (nl 4.8-32.0 mm Hg)  Mid-respiratory LESP: 18.2 mm Hg (nl 13-43 mm Hg)  Mean IRP: 1.0 mm Hg (nl <15 mm Hg)  Mean DCI: 139.7 mm Hg-cm-sec (nl 450-8000)  Swallows: 6 failed (DCI <100 Hg-cm-sec) and 4 weak (100-450 Hg-cm-sec)  10/10 swallows with incomplete transit seen on impedance. MRS with augmentation is not intact. ?  IMPRESSION:  EGJ: The EGJ morphology is consistent with type II and is hyponormotensive. There is evidence of normal EGJ outflow   pressures based on IRP. Esophageal body: The contractile pattern is consistent with ineffective peristalsis/contractility. 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 ADDENDUM: 24 hr Ph monitoring reveals Deemester score 85. 6. 31 total reflux episodes, Longest reflux 50 minutes. 22.8% of time with acid exposure time (5:17). SAP 98.9 for cough. This is consistent with abnormal/increased acid exposure.

## 2021-09-09 ENCOUNTER — TRANSCRIBE ORDER (OUTPATIENT)
Dept: REGISTRATION | Age: 66
End: 2021-09-09

## 2021-09-09 ENCOUNTER — HOSPITAL ENCOUNTER (OUTPATIENT)
Dept: PREADMISSION TESTING | Age: 66
Discharge: HOME | End: 2021-09-09
Payer: MEDICARE

## 2021-09-09 DIAGNOSIS — Z01.812 PRE-PROCEDURE LAB EXAM: Primary | ICD-10-CM

## 2021-09-09 DIAGNOSIS — Z01.812 PRE-PROCEDURE LAB EXAM: ICD-10-CM

## 2021-09-09 PROCEDURE — U0003 INFECTIOUS AGENT DETECTION BY NUCLEIC ACID (DNA OR RNA); SEVERE ACUTE RESPIRATORY SYNDROME CORONAVIRUS 2 (SARS-COV-2) (CORONAVIRUS DISEASE [COVID-19]), AMPLIFIED PROBE TECHNIQUE, MAKING USE OF HIGH THROUGHPUT TECHNOLOGIES AS DESCRIBED BY CMS-2020-01-R: HCPCS

## 2021-09-10 LAB
SARS-COV-2, XPLCVT: NOT DETECTED
SOURCE, COVRS: NORMAL

## 2021-09-13 ENCOUNTER — HOSPITAL ENCOUNTER (OUTPATIENT)
Age: 66
Setting detail: OUTPATIENT SURGERY
Discharge: HOME | End: 2021-09-13
Attending: INTERNAL MEDICINE | Admitting: INTERNAL MEDICINE
Payer: MEDICARE

## 2021-09-13 ENCOUNTER — ANESTHESIA EVENT (OUTPATIENT)
Dept: ENDOSCOPY | Age: 66
End: 2021-09-13
Payer: MEDICARE

## 2021-09-13 ENCOUNTER — ANESTHESIA (OUTPATIENT)
Dept: ENDOSCOPY | Age: 66
End: 2021-09-13
Payer: MEDICARE

## 2021-09-13 VITALS
RESPIRATION RATE: 18 BRPM | WEIGHT: 145 LBS | HEIGHT: 66 IN | OXYGEN SATURATION: 96 % | HEART RATE: 75 BPM | DIASTOLIC BLOOD PRESSURE: 76 MMHG | SYSTOLIC BLOOD PRESSURE: 125 MMHG | BODY MASS INDEX: 23.3 KG/M2

## 2021-09-13 PROCEDURE — 76040000019: Performed by: INTERNAL MEDICINE

## 2021-09-13 PROCEDURE — 74011250636 HC RX REV CODE- 250/636: Performed by: NURSE ANESTHETIST, CERTIFIED REGISTERED

## 2021-09-13 PROCEDURE — 76060000031 HC ANESTHESIA FIRST 0.5 HR: Performed by: INTERNAL MEDICINE

## 2021-09-13 PROCEDURE — 2709999900 HC NON-CHARGEABLE SUPPLY: Performed by: INTERNAL MEDICINE

## 2021-09-13 RX ORDER — DEXTROMETHORPHAN/PSEUDOEPHED 2.5-7.5/.8
1.2 DROPS ORAL
Status: DISCONTINUED | OUTPATIENT
Start: 2021-09-13 | End: 2021-09-13 | Stop reason: HOSPADM

## 2021-09-13 RX ORDER — SODIUM CHLORIDE 9 MG/ML
50 INJECTION, SOLUTION INTRAVENOUS CONTINUOUS
Status: DISCONTINUED | OUTPATIENT
Start: 2021-09-13 | End: 2021-09-13 | Stop reason: HOSPADM

## 2021-09-13 RX ORDER — SODIUM CHLORIDE 0.9 % (FLUSH) 0.9 %
5-40 SYRINGE (ML) INJECTION EVERY 8 HOURS
Status: DISCONTINUED | OUTPATIENT
Start: 2021-09-13 | End: 2021-09-13 | Stop reason: HOSPADM

## 2021-09-13 RX ORDER — NALOXONE HYDROCHLORIDE 0.4 MG/ML
0.4 INJECTION, SOLUTION INTRAMUSCULAR; INTRAVENOUS; SUBCUTANEOUS
Status: DISCONTINUED | OUTPATIENT
Start: 2021-09-13 | End: 2021-09-13 | Stop reason: HOSPADM

## 2021-09-13 RX ORDER — FAMOTIDINE 20 MG/1
20 TABLET, FILM COATED ORAL 2 TIMES DAILY
COMMUNITY

## 2021-09-13 RX ORDER — PROPOFOL 10 MG/ML
INJECTION, EMULSION INTRAVENOUS AS NEEDED
Status: DISCONTINUED | OUTPATIENT
Start: 2021-09-13 | End: 2021-09-13 | Stop reason: HOSPADM

## 2021-09-13 RX ORDER — ATROPINE SULFATE 0.1 MG/ML
0.5 INJECTION INTRAVENOUS
Status: DISCONTINUED | OUTPATIENT
Start: 2021-09-13 | End: 2021-09-13 | Stop reason: HOSPADM

## 2021-09-13 RX ORDER — FENTANYL CITRATE 50 UG/ML
100 INJECTION, SOLUTION INTRAMUSCULAR; INTRAVENOUS
Status: DISCONTINUED | OUTPATIENT
Start: 2021-09-13 | End: 2021-09-13 | Stop reason: HOSPADM

## 2021-09-13 RX ORDER — GLUCOSAMINE SULFATE 1500 MG
1000 POWDER IN PACKET (EA) ORAL DAILY
COMMUNITY

## 2021-09-13 RX ORDER — FLUMAZENIL 0.1 MG/ML
0.2 INJECTION INTRAVENOUS
Status: DISCONTINUED | OUTPATIENT
Start: 2021-09-13 | End: 2021-09-13 | Stop reason: HOSPADM

## 2021-09-13 RX ORDER — EPINEPHRINE 0.1 MG/ML
1 INJECTION INTRACARDIAC; INTRAVENOUS
Status: DISCONTINUED | OUTPATIENT
Start: 2021-09-13 | End: 2021-09-13 | Stop reason: HOSPADM

## 2021-09-13 RX ORDER — SODIUM CHLORIDE 9 MG/ML
INJECTION, SOLUTION INTRAVENOUS
Status: DISCONTINUED | OUTPATIENT
Start: 2021-09-13 | End: 2021-09-13 | Stop reason: HOSPADM

## 2021-09-13 RX ORDER — MIDAZOLAM HYDROCHLORIDE 1 MG/ML
.25-1 INJECTION, SOLUTION INTRAMUSCULAR; INTRAVENOUS
Status: DISCONTINUED | OUTPATIENT
Start: 2021-09-13 | End: 2021-09-13 | Stop reason: HOSPADM

## 2021-09-13 RX ORDER — SODIUM CHLORIDE 0.9 % (FLUSH) 0.9 %
5-40 SYRINGE (ML) INJECTION AS NEEDED
Status: DISCONTINUED | OUTPATIENT
Start: 2021-09-13 | End: 2021-09-13 | Stop reason: HOSPADM

## 2021-09-13 RX ADMIN — PROPOFOL 50 MG: 10 INJECTION, EMULSION INTRAVENOUS at 12:59

## 2021-09-13 RX ADMIN — PROPOFOL 30 MG: 10 INJECTION, EMULSION INTRAVENOUS at 13:00

## 2021-09-13 RX ADMIN — SODIUM CHLORIDE: 900 INJECTION, SOLUTION INTRAVENOUS at 12:54

## 2021-09-13 RX ADMIN — PROPOFOL 50 MG: 10 INJECTION, EMULSION INTRAVENOUS at 12:57

## 2021-09-13 NOTE — H&P
118 [facility].  217 [facility] 140 [facility], [address]  [phone]                                History and Physical     NAME: Mandi Campo   :  1955   MRN:  [id number]     HPI:  The patient was seen and examined. 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 No pertinent family history. Current Facility-Administered Medications   Medication Dose Route Frequency    0.9% sodium chloride infusion  50 mL/hr IntraVENous CONTINUOUS    sodium chloride (NS) flush 5-40 mL  5-40 mL IntraVENous Q8H    sodium chloride (NS) flush 5-40 mL  5-40 mL IntraVENous PRN    midazolam (VERSED) injection 0.25-10 mg  0.25-10 mg IntraVENous Multiple    fentaNYL citrate (PF) injection 100 mcg  100 mcg IntraVENous MULTIPLE DOSE GIVEN    naloxone (NARCAN) injection 0.4 mg  0.4 mg IntraVENous Multiple    flumazeniL (ROMAZICON) 0.1 mg/mL injection 0.2 mg  0.2 mg IntraVENous Multiple    simethicone (MYLICON) 72BW/6.2EN oral drops 80 mg  1.2 mL Oral Multiple    atropine injection 0.5 mg  0.5 mg IntraVENous ONCE PRN    EPINEPHrine (ADRENALIN) 0.1 mg/mL syringe 1 mg  1 mg Endoscopically ONCE PRN         PHYSICAL EXAM:  General: WD, WN. Alert, cooperative, no acute distress    HEENT: NC, Atraumatic. PERRLA, EOMI. Anicteric sclerae. Lungs:  CTA Bilaterally. No Wheezing/Rhonchi/Rales.   Heart:  Regular  rhythm,  No murmur, No Rubs, No Gallops  Abdomen: Soft, Non distended, Non tender. +Bowel sounds, no HSM  Extremities: No c/c/e  Neurologic:  CN 2-12 gi, Alert and oriented X 3. No acute neurological distress   Psych:   Good insight. Not anxious nor agitated. The heart, lungs and mental status were satisfactory for the administration of MAC sedation and for the procedure. Mallampati score: 2     The patient was counseled at length about the risks of michelle Covid-19 in the lam-operative and post-operative states including the recovery window of their procedure. The patient was made aware that michelle Covid-19 after a surgical procedure may worsen their prognosis for recovering from the virus and lend to a higher morbidity and or mortality risk. The patient was given the options of postponing their procedure. All of the risks, benefits, and alternatives were discussed. The patient does wish to proceed with the procedure.       Assessment:   · GERD    Plan:   · Endoscopic procedure :Egd  · MAC sedation

## 2021-09-13 NOTE — ANESTHESIA POSTPROCEDURE EVALUATION
Procedure(s):  ESOPHAGOGASTRODUODENOSCOPY (EGD)   :-.    MAC    Anesthesia Post Evaluation        Patient participation: complete - patient participated  Level of consciousness: awake  Pain management: adequate  Airway patency: patent  Anesthetic complications: no  Cardiovascular status: hemodynamically stable  Respiratory status: acceptable  Hydration status: acceptable  Comments: The patient is ready for PACU discharge. Kayla Law DO                   Post anesthesia nausea and vomiting:  controlled      INITIAL Post-op Vital signs:   Vitals Value Taken Time   /73 09/13/21 1330   Temp     Pulse 71 09/13/21 1337   Resp 16 09/13/21 1337   SpO2 99 % 09/13/21 1337   Vitals shown include unvalidated device data.

## 2021-09-13 NOTE — PERIOP NOTES

## 2021-09-13 NOTE — DISCHARGE INSTRUCTIONS
118 [facility].  217 [address] Gastelum [facility]  [id number]  1955    It was my pleasure seeing you for your procedure. You will also receive a summary report with the findings from this procedure and any further recommendations. If you had polyps removed or biopsies taken during your procedure, you will receive a separate letter from me within the next 2 weeks. If you don't receive this letter or if you have any questions, please call my office [phone]. Please take note of the post procedure instructions listed below. Mark Anthony Lynch,    Dr. Timothy Pratt    These instructions give you information on caring for yourself after your procedure. Call your doctor if you have any problems or questions after your procedure. HOME CARE  · Walk if you have belly cramping or gas. Walking will help get rid of the air and reduce the bloated feeling in your belly (abdomen). · Your IV site (where you received drugs) may be tender to touch. Place warm towels on the site; keep your arm up on two pillows if you have any swelling or soreness in the area. · You may shower. ACTIVITY:  · Take frequent rest periods and move at a slower pace for the next 24 hours. .  · You may resume your regular activity tomorrow if you are feeling back to normal.  · Do not drive or ride a bicycle for at least 24 hours (because of the medicine (anesthesia) used during the test). · Do not sign any important legal documents or use or operate any machinery for 24 hours  · Do not take sleeping medicines/nerve drugs for 24 hours unless the doctor tells you. · You can return to work/school tomorrow unless otherwise instructed. NUTRITION:  · Drink plenty of fluids to keep your pee (urine) clear or pale yellow  · Begin with a light meal and progress to your normal diet.  Heavy or fried foods are harder to digest and may make you feel sick to your stomach (nauseated). · Once you are feeling back to normal, you may resume your normal diet as instructed by your doctor. · Avoid alcoholic beverages for 24 hours or as instructed. IF YOU HAD BIOPSIES TAKEN OR POLYPS REMOVED DURING THE PROCEDURE:  · For the next 7 days, avoid all non-steroidal antiinflammatory medications such as Ibuprofen, Motrin, Advil, Alleve, Marlene-seltzer, Goody's powder, BC powder. · If you do not have an heart condition that requires you to take a daily aspirin, you should avoid taking aspirin for 7 days. · Eat a soft diet for 24 hours. · Monitor your stools for any blood or dark black, tar-like, stools as this may be a sign of bleeding and if you see any blood, notify your doctor immediately. GET HELP RIGHT AWAY AND SEEK IMMEDIATE MEDICAL CARE IF:  · You have more than a spotting of blood in your stool. · You pass clumps of tissue (blood clots) or fill the toilet with blood. · Your belly is painfully swollen or puffy (abdominal distention). · You throw up (vomit). · You have a fever. · You have redness, pain or swelling at the IV site that last greater than two days. · You have abdominal pain or discomfort that is severe or gets worse throughout the day. Post-procedure diagnosis:  esophagitis  hiatal hernia    Post-procedure recommendations:  Findings:  Esophagus Z-line 35 cm from the incisors with Grade A esophagitis, 3 cm sliding hiatal hernia (35-38 cm from the incisors)   Stomach:normal   Duodenum/jejunum:normal    Recommendations:   - Follow up with surgery re: possible hiatal hernia repair/partial fundoplication.   - Resume normal medications. Learning About Coronavirus [phone])  Coronavirus [phone]): Overview  What is coronavirus (COVID-19)? The coronavirus disease (COVID-19) is caused by a virus. It is an illness that was first found in [place], [place], in December 2019. It has since spread worldwide.   The virus can cause fever, cough, and trouble breathing. In severe cases, it can cause pneumonia and make it hard to breathe without help. It can cause death. Coronaviruses are a large group of viruses. They cause the common cold. They also cause more serious illnesses like Middle East respiratory syndrome (MERS) and severe acute respiratory syndrome (SARS). COVID-19 is caused by a novel coronavirus. That means it's a new type that has not been seen in people before. This virus spreads person-to-person through droplets from coughing and sneezing. It can also spread when you are close to someone who is infected. And it can spread when you touch something that has the virus on it, such as a doorknob or a tabletop. What can you do to protect yourself from coronavirus (COVID-19)? The best way to protect yourself from getting sick is to:  · Avoid areas where there is an outbreak. · Avoid contact with people who may be infected. · Wash your hands often with soap or alcohol-based hand sanitizers. · Avoid crowds and try to stay at least 6 feet away from other people. · Wash your hands often, especially after you cough or sneeze. Use soap and water, and scrub for at least 20 seconds. If soap and water aren't available, use an alcohol-based hand . · Avoid touching your mouth, nose, and eyes. What can you do to avoid spreading the virus to others? To help avoid spreading the virus to others:  · Cover your mouth with a tissue when you cough or sneeze. Then throw the tissue in the trash. · Use a disinfectant to clean things that you touch often. · Stay home if you are sick or have been exposed to the virus. Don't go to school, work, or public areas. And don't use public transportation. · If you are sick:  ? Leave your home only if you need to get medical care. But call the doctor's office first so they know you're coming.  And wear a face mask, if you have one.  ? If you have a face mask, wear it whenever you're around other people. It can help stop the spread of the virus when you cough or sneeze. ? Clean and disinfect your home every day. Use household  and disinfectant wipes or sprays. Take special care to clean things that you grab with your hands. These include doorknobs, remote controls, phones, and handles on your refrigerator and microwave. And don't forget countertops, tabletops, bathrooms, and computer keyboards. When to call for help  Call 911 anytime you think you may need emergency care. For example, call if:  · You have severe trouble breathing. (You can't talk at all.)  · You have constant chest pain or pressure. · You are severely dizzy or lightheaded. · You are confused or can't think clearly. · Your face and lips have a blue color. · You pass out (lose consciousness) or are very hard to wake up. Call your doctor now if you develop symptoms such as:  · Shortness of breath. · Fever. · Cough. If you need to get care, call ahead to the doctor's office for instructions before you go. Make sure you wear a face mask, if you have one, to prevent exposing other people to the virus. Where can you get the latest information? The following health organizations are tracking and studying this virus. Their websites contain the most up-to-date information. Jensen Malin also learn what to do if you think you may have been exposed to the virus. · U.S. Centers for Disease Control and Prevention (CDC): The CDC provides updated news about the disease and travel advice. The website also tells you how to prevent the spread of infection. www.cdc.gov  · World Health Organization [facility]): WHO offers information about the virus outbreaks. WHO also has travel advice. www.who.int  Current as of: April 1, 2020               Content Version: 12.4  © 0050-9155 [employer].    Care instructions adapted under license by your healthcare professional. If you have questions about a medical condition or this instruction, always ask your healthcare professional. [place] [address] any warranty or liability for your use of this information.

## 2021-09-13 NOTE — PROCEDURES
118 [facility].  217 [facility] 140 Shadi [address]imes, [address]   [phone]                            NAME:  Tl Solano   :   1955   MRN:   [id number]     Date/Time:  2021 1:06 PM    Esophagogastroduodenoscopy (EGD) Procedure Note    :  Cristiana Pedro MD    Staff: Endoscopy RN-1: Main Noble RN  Endoscopy RN-2: German Still RN    Referring Provider:  Tiffanie Frederick MD    Anethesia/Sedation:  MAC anesthesia    Procedure Details   After infomed consent was obtained for the procedure, with all risks and benefits of procedure explained the patient was taken to the endoscopy suite and placed in the left lateral decubitus position. Following sequential administration of sedation as per above, the gastroscope was inserted into the mouth and advanced under direct vision to second portion of the duodenum. A careful inspection was made as the gastroscope was withdrawn, including a retroflexed view of the proximal stomach; findings and interventions are described below. Findings:  Esophagus Z-line 35 cm from the incisors with Grade A esophagitis, 3 cm sliding hiatal hernia (35-38 cm from the incisors)   Stomach:normal   Duodenum/jejunum:normal    Interventions:  none           Specimens Removed:  * No specimens in log *    Complications: None. EBL:  None    Impression:    See Postoperative diagnosis above    Recommendations:   - Follow up with surgery re: possible hiatal hernia repair/partial fundoplication.   - Resume normal medications.      Discharge disposition:  Home in the company of  when able to ambulate    Cristiana Perdo MD

## 2022-02-28 ENCOUNTER — HOSPITAL ENCOUNTER (OUTPATIENT)
Dept: PHYSICAL THERAPY | Age: 67
Discharge: HOME | End: 2022-02-28
Payer: MEDICARE

## 2022-02-28 PROCEDURE — 97162 PT EVAL MOD COMPLEX 30 MIN: CPT | Performed by: PHYSICAL MEDICINE & REHABILITATION

## 2022-02-28 PROCEDURE — 97535 SELF CARE MNGMENT TRAINING: CPT | Performed by: PHYSICAL MEDICINE & REHABILITATION

## 2022-02-28 NOTE — PROGRESS NOTES
Physical Therapy at [facility],   a part of  Merissa Sanchez  [address] [facility] Kranthi Traore  Phone: [phone]  Fax: [phone]    Plan of Care/Statement of Necessity for Physical Therapy Services  2-15    Patient name: Adela Beltran  : 1955  Provider#: [id number]  Referral source: Laura Valentin MD      Medical/Treatment Diagnosis: Pain in right hip [M25.551]     Prior Hospitalization: see medical history     Comorbidities:  GERD, IBS,   Prior Level of Function: Chronic  Medications: Verified on Patient Summary List  Start of Care: 22      Onset Date:  Chronic   The Plan of Care and following information is based on the information from the initial evaluation. Assessment/ key information: Pt with complaints of alternating IBS-C, IBS-D, low levels of pelvic floor dysfunction, high levels of worry regarding fecal continence. Assessment is ongoing. She will benefit from skilled PT services to address her functional limitations and achieve her goals as stated on the plan of care. Evaluation Complexity History HIGH Complexity :3+ comorbidities / personal factors will impact the outcome/ POC ; Examination MEDIUM Complexity : 3 Standardized tests and measures addressing body structure, function, activity limitation and / or participation in recreation  ;Presentation MEDIUM Complexity : Evolving with changing characteristics  ; Clinical Decision Making MEDIUM Complexity : FOTO score of 26-74  Overall Complexity Rating: MEDIUM    Problem List: decrease strength, decrease ADL/ functional abilitiies, decrease activity tolerance and decrease flexibility/ joint mobility   Treatment Plan may include any combination of the following: Therapeutic exercise, Therapeutic activities, Neuromuscular re-education, Physical agent/modality, Manual therapy, Patient education, Self Care training and Functional mobility training  Patient / Family readiness to learn indicated by: asking questions  Persons(s) to be included in education: patient (P)  Barriers to Learning/Limitations: None  Patient Goal (s): normal bowel function  Patient Self Reported Health Status: good  Rehabilitation Potential: good    Short Term Goals: To be accomplished in 6 weeks:  Patient will be independent with a progressive home exercise program    Patient will demonstrate & utilized pelvic floor ms protection techniques   Patient will demonstrate improved PFM strength to 3+/5 bilaterally in order to decrease FI symptoms   Patient will report improved stool quality to BSS 4. Long Term Goals: To be accomplished in 12 weeks:  Patient will demonstrate 4/5 PFM strength bilaterally in order to eliminate FI symptoms. Patient will demonstrate 10 second PFM holds at 4/5 in order to elminate FI symptoms. Patient will report no bothersome fecal urge x 2 weeks. Frequency / Duration: Patient to be seen 1 times per week for 6-12 weeks. Patient/ Caregiver education and instruction: self care and activity modification    [x]  Plan of care has been reviewed with PTA    Certification Period:  02/28/22 - 05/26/22  Jocelyne Perez PT, MSPT   2/28/2022     ________________________________________________________________________    I certify that the above Therapy Services are being furnished while the patient is under my care. I agree with the treatment plan and certify that this therapy is necessary.     Physician's Signature:____________________  Date:____________Time: _________         Loli Weaver MD

## 2022-03-11 ENCOUNTER — APPOINTMENT (OUTPATIENT)
Dept: PHYSICAL THERAPY | Age: 67
End: 2022-03-11
Payer: MEDICARE

## 2022-03-21 ENCOUNTER — HOSPITAL ENCOUNTER (OUTPATIENT)
Dept: PHYSICAL THERAPY | Age: 67
Discharge: HOME | End: 2022-03-21
Payer: MEDICARE

## 2022-03-21 PROCEDURE — 97535 SELF CARE MNGMENT TRAINING: CPT | Performed by: PHYSICAL MEDICINE & REHABILITATION

## 2022-03-21 NOTE — PROGRESS NOTES
PT DAILY TREATMENT NOTE 2-15    Patient Name: Chadwick Hurley  Date:3/21/2022  : 1955  [x]  Patient  Verified  Payor: [place] MED[place]ARE / Plan: [place] MEDICARE PART A & B / Product Type: Medicare /    In time: 75  Out time: 0230  Total Treatment Time (min): 45  Visit #:  2    Treatment Area: Pain in right hip [M25.551]    SUBJECTIVE  Pain Level (0-10 scale): 0   Any medication changes, allergies to medications, adverse drug reactions, diagnosis change, or new procedure performed?: [x] No    [] Yes (see summary sheet for update)  Subjective functional status/changes:   [] No changes reported  Feeling much better, \"I'm in a good place right now\"  Having BM's every 1-3 days, larger sized, more complete. No nausea feeling, no cramping. Taking Citrucel daily with good results. Started [place], feels this is helping. Pt's daughter is starting [place] for AS. OBJECTIVE        45 min Self Care/Home Management:  []  See flow sheet :      Patient instructed in the role of physical therapy in the evaluation and treatment of pelvic floor dysfunction, applicable treatment modalities discussed. Expectations for regular home exercise participation and expected visit frequency in to achieve the patient's goals were discussed. Patient instructed in pelvic floor anatomy and function including levator ani, puborectalis and superficial pelvic  musculature. Patient was provided dietary information to improve stool quality including increasing soluble fiber intake (Bran Buds), probiotics (Activia yogurt) and increased water intake (6-8 glasses a day). Pt instructed in use of [place] stool chart to track progress. Patient educated in proper defecation posture and technique including use of Squatty Potty for better puborectalis ms relaxation. Pt to avoid straining to pass stool in order to decrease strain on pelvic floor.    Discussed glotis opening humming/exhale with defecation to improve rectal clearing. Discussed impact of stress on gut motility, discussed ways to mediate stress to improve gut function. Discussed referral to [facility]. She has an appointment for April, but considering cancelling. Angela Peterson uses a fairly strict elimination diet to determine food sensitivities, she will need to be ready to go through an elimination/reintroduction diet for best results. She will wait to see results of Xifaxin before she decides whether or not to proceed. Pt to add Bran Flakes to diet daily. Start small amounts, work up to a cup. Rationale: improve patient understanding  to improve the patients ability to clear rectum, eliminate constipation. With   [] TE   [] TA   [] Neuro   [x] SC   [] other: Patient Education: [x] Review HEP    [x] Progressed/Changed HEP based on:   [x] positioning   [x] body mechanics   [] transfers   [] heat/ice application    [] other:      Other Objective/Functional Measures: Demonstrated good understanding of all concepts practiced today. Pain Level (0-10 scale) post treatment: 0     ASSESSMENT/Changes in Function:     Pt is doing well, she feels she does not need pelvic exam or pelvic ms training. Would like to check in in 3-4 weeks PRN. [x]  See Plan of Care  []  See progress note/recertification  []  See Discharge Summary         Progress towards goals / Updated goals:  Able to advance exercises today with good tolerance. Pt continues to need instruction for correct exercise form and performance. Continues to demonstrate good potential to achieve functional goals stated on the plan of care. PLAN  []  Upgrade activities as tolerated     []  Continue plan of care  []  Update interventions per flow sheet       []  Discharge due to:_  [x]  Other: See Back PRN.         Maurice Parra, PT, MSPT  3/21/2022

## 2022-05-11 ENCOUNTER — HOSPITAL ENCOUNTER (OUTPATIENT)
Dept: PHYSICAL THERAPY | Age: 67
Discharge: HOME | End: 2022-05-11
Payer: MEDICARE

## 2022-05-11 PROCEDURE — 97110 THERAPEUTIC EXERCISES: CPT | Performed by: PHYSICAL MEDICINE & REHABILITATION

## 2022-05-11 NOTE — PROGRESS NOTES
PT DAILY TREATMENT NOTE 2-15    Patient Name: Pauline Combs  Date:2022  : 1955  [x]  Patient  Verified  Payor: Jake Fernández / Plan: [place] MEDICARE PART A & B / Product Type: Medicare /    In time: 100  Out time: 014  Total Treatment Time (min): 45  Visit #:  3    Treatment Area: Pain in right hip [M25.551]    SUBJECTIVE  Pain Level (0-10 scale): 0   Any medication changes, allergies to medications, adverse drug reactions, diagnosis change, or new procedure performed?: [x] No    [] Yes (see summary sheet for update)  Subjective functional status/changes:   [] No changes reported    Doing much better in terms of abdominal discomfort. Having multiple small volume BMs 4-5x per day, then will have days with 1 good AM BM  No longer having fecal smearing  Not using a Squatty Potty consistently  Has stuttering/pausing urine flow - this is relatively new      OBJECTIVE    External Pelvic Exam  Non tender through external pelvic clock  No POP at rest or with sustained valsalva  Active contraction: present  Active relaxation: present   Involuntary relaxation: Present    Internal Vaginal Exam:  Layer 1 Normal tone, non tender  Layer 2/3 Normal tone, non tender, some vaginal wall atrophy/thinning noted  Bladder neck mobility:  WNL     PERFECT SCORE CHART  P =  Power (Laycock Scale Grade 0-5)  3+  E =  Endurance (How long pt holds max contraction)  5  R =  Repetitions (How many times the repeats holds)  5  F =  Fast Twitch (How many 1 second contractions in 10 seconds) NT  E =  Elevation (Lift of post vaginal wall toward pubic bone) Present  C =  Coordinated cocontraction of transverse abdominus absent  T = Timing (squeeze and lift with cough) NT    45 min Therapeutic Exercise:  [] See flow sheet :    PFMT with emphasis on bulge/relax, breath coordination. Avoid clenching through her day  Elevator to the basement maneuver.        Rationale: improve coordination and increase proprioception to improve the patients ability to fully clear rectum, have uninterrupted urine flow. With   [x] TE   [] TA   [] Neuro   [] SC   [] other: Patient Education: [x] Review HEP    [] Progressed/Changed HEP based on:   [x] positioning   [x] body mechanics   [] transfers   [] heat/ice application    [x] other:     Pt using Squatty Potty stool inconsistenty, advised using this every time to promote full pelvic floor ms relaxation and improved rectal clearing. Other Objective/Functional Measures: Demonstrated good understanding of all concepts and exercises discussed and practiced today. Pain Level (0-10 scale) post treatment: 0     ASSESSMENT/Changes in Function:   Sxs consisent with high and tight pelvic floor ms. Started bulge/relax techniques today. Will initiate biofeedback next visit as indicated. Patient will continue to benefit from skilled PT services to modify and progress therapeutic interventions to attain remaining goals. [x]  See Plan of Care  []  See progress note/recertification  []  See Discharge Summary         Progress towards goals / Updated goals:  Able to advance exercises today with good tolerance. Pt continues to need instruction for correct exercise form and performance. Continues to demonstrate good potential to achieve functional goals stated on the plan of care.       PLAN  [x]  Upgrade activities as tolerated     []  Continue plan of care  []  Update interventions per flow sheet       []  Discharge due to:_  []  Other:_      Cindy Spears, PT, MSPT   5/11/2022

## 2022-06-08 ENCOUNTER — HOSPITAL ENCOUNTER (OUTPATIENT)
Dept: PHYSICAL THERAPY | Age: 67
Discharge: HOME | End: 2022-06-08
Payer: MEDICARE

## 2022-06-08 PROCEDURE — 97110 THERAPEUTIC EXERCISES: CPT | Performed by: PHYSICAL MEDICINE & REHABILITATION

## 2022-06-08 NOTE — PROGRESS NOTES
Physical Therapy at [facility],   a part of  [facility]  [address] [facility], [address] [place] [address] Drive  Phone: [phone] Fax: [phone]      Discharge Summary 2-15    Patient name: Di Rosa  : 1955  Provider#: [id number]  Referral source: Sean Alegre MD      Medical/Treatment Diagnosis: Pain in right hip [M25.551]     Prior Hospitalization: see medical history     Comorbidities: See Plan of Care  Prior Level of Function: See Plan of Care  Medications: Verified on Patient Summary List    Start of Care: 22      Onset Date: Chronic   Visits from Start of Care: 4     Missed Visits: 0   Reporting Period : 22 to 22    Summary of Care:  Pt referred to pelvic PT for evaluation and treatment of pelvic floor ms dysfunction, fecal urgency, concerns about fecal incontinence. Treatment consisted of manual therapy, therapeutic exercise, therapeutic activity, bladder health education, bowel habit education, pelvic floor training, home exercise program development and progression. She demonstrates good initial response to PT interventions, demonstrates good understanding of home recommendations and HEP, good compliance. Pt reports 90% improvement in chief complaint pelvic floor symptoms. She is having 1 BM daily rated BSS4. No longer has bothersome fecal urgency.    She feels ready to DC to HEP.         External Pelvic Exam  Non tender through external pelvic clock  No POP at rest or with sustained valsalva  Active contraction: present  Active relaxation: present  Involuntary relaxation: present     Internal Vaginal Exam:  Layer 1  Non tender, normal tone  Layer 2/3 Non tender, normal tone, mild vaginal wall atrophy  Bladder neck mobility:  WNL    PERFECT SCORE CHART  P =  Power (Laycock Scale Grade 0-5)   4+  E =  Endurance (How long pt holds max contraction)  10  R =  Repetitions (How many times the repeats holds)  5+  F =  Fast Twitch (How many 1 second contractions in 10 seconds)  NT  E =  Elevation (Lift of post vaginal wall toward pubic bone) Present  C =  Coordinated cocontraction of transverse abdominus Absent  T = Timing (squeeze and lift with cough) Absent     Assessment / Recommendations:   Short Term Goals: To be accomplished in 6 weeks:  Patient will be independent with a progressive home exercise program  MET, not consistent  Patient will demonstrate & utilized pelvic floor ms protection techniques  MET, not consistent  Patient will demonstrate improved PFM strength to 3+/5 bilaterally in order to decrease FI symptoms MET  Patient will report improved stool quality to BSS 4. IMPROVING     Long Term Goals: To be accomplished in 12 weeks:  Patient will demonstrate 4/5 PFM strength bilaterally in order to eliminate FI symptoms. PROGRESSING  Patient will demonstrate 10 second PFM holds at 4/5 in order to elminate FI symptoms. PROGRESSING  Patient will report no bothersome fecal urge x 2 weeks.   NOT MET     RECOMMENDATIONS:  [x]Discontinue therapy: [x]Patient has reached or is progressing toward set goals     []Patient is non-compliant or has abdicated     []Due to lack of appreciable progress towards set goals     []Other    Jamison Denise PT, MSPT   6/8/2022

## 2022-06-08 NOTE — PROGRESS NOTES
Physical Therapy at [facility],   a part of  Merissa Callahan martine  [place]  [facility]  Kranthi Villanueva  Phone: [phone]      Fax:  [phone]    Progress Note    Name: Beryle Shanks   : 1955   MD: Jovon Espinosa MD       Treatment Diagnosis: Pain in right hip [M25.551]  Start of Care: 11    Visits from Start of Care: 3  Missed Visits: 0     Summary of Care:  Pt referred to pelvic PT for evaluation and treatment of pelvic floor ms dysfunction, fecal urgency, concerns about fecal incontinence. Treatment consisted of manual therapy, therapeutic exercise, therapeutic activity, bladder health education, bowel habit education, pelvic floor training, home exercise program development and progression. She demonstrates good initial response to PT interventions, demonstrates good understanding of home recommendations and HEP, fair compliance. She will benefit from continued PT to further her progress toward functional goals as stated below. Assessment / Recommendations:   Short Term Goals: To be accomplished in 6 weeks:  Patient will be independent with a progressive home exercise program  MET, not consistent  Patient will demonstrate & utilized pelvic floor ms protection techniques  MET, not consistent  Patient will demonstrate improved PFM strength to 3+/5 bilaterally in order to decrease FI symptoms MET  Patient will report improved stool quality to BSS 4. IMPROVING     Long Term Goals: To be accomplished in 12 weeks:  Patient will demonstrate 4/5 PFM strength bilaterally in order to eliminate FI symptoms. PROGRESSING  Patient will demonstrate 10 second PFM holds at 4/5 in order to elminate FI symptoms. PROGRESSING  Patient will report no bothersome fecal urge x 2 weeks.   NOT MET     PLAN: Continue PT 1x per week x 6 weeks        Danny Omalley, PT 2022

## 2022-06-08 NOTE — PROGRESS NOTES
PT DAILY TREATMENT NOTE 2-15    Patient Name: Jackeline Dunn  Date:2022  : 1955  [x]  Patient  Verified  Payor: [place] MEDICARE / Plan: [place] MEDICARE PART A & B / Product Type: Medicare /    In time: 0100  Out time: 014  Total Treatment Time (min): 45  Visit #:  4    Treatment Area: Pain in right hip [M25.551]    SUBJECTIVE  Pain Level (0-10 scale): 0   Any medication changes, allergies to medications, adverse drug reactions, diagnosis change, or new procedure performed?: [x] No    [] Yes (see summary sheet for update)  Subjective functional status/changes:   [] No changes reported    Doing much better in terms of abdominal discomfort. Having 1 BM nearly daily, BSS 4. Will have occasional episodes of fecal urgency. Continues to note stuttering urine flow, feels this is due to poor relaxation, not bothersome. Feels ready to DC from PT.     OBJECTIVE      45 min Therapeutic Exercise:  [] See flow sheet :    PFMT with emphasis on bulge/relax, breath coordination. Avoid clenching through her day  Elevator to the basement maneuver. Rationale: improve coordination and increase proprioception to improve the patients ability to fully clear rectum, have uninterrupted urine flow. With   [x] TE   [] TA   [] Neuro   [] SC   [] other: Patient Education: [x] Review HEP    [] Progressed/Changed HEP based on:   [x] positioning   [x] body mechanics   [] transfers   [] heat/ice application    [x] other:     HEP review  Home instruction review. Other Objective/Functional Measures: Demonstrated good understanding of all concepts and exercises discussed and practiced today.           External Pelvic Exam  Non tender through external pelvic clock  No POP at rest or with sustained valsalva  Active contraction: present  Active relaxation: present  Involuntary relaxation: present    Internal Vaginal Exam:  Layer 1  Non tender, normal tone  Layer 2/3 Non tender, normal tone, mild vaginal wall atrophy  Bladder neck mobility:  WNL    PERFECT SCORE CHART  P =  Power (Laycock Scale Grade 0-5)   4+  E =  Endurance (How long pt holds max contraction)  10  R =  Repetitions (How many times the repeats holds)  5+  F =  Fast Twitch (How many 1 second contractions in 10 seconds)  NT  E =  Elevation (Lift of post vaginal wall toward pubic bone) Present  C =  Coordinated cocontraction of transverse abdominus Absent  T = Timing (squeeze and lift with cough) Absent     Pain Level (0-10 scale) post treatment: 0     ASSESSMENT/Changes in Function:          []  See Plan of Care  []  See progress note/recertification  [x]  See Discharge Summary         Progress towards goals / Updated goals:  Met or progressing toward all goals, ready for DC     PLAN  []  Upgrade activities as tolerated     []  Continue plan of care  []  Update interventions per flow sheet       [x]  Discharge due to: goals met  []  Other:_      Adam Ernandez, PT, MSPT   6/8/2022

## 2022-06-27 ENCOUNTER — APPOINTMENT (OUTPATIENT)
Dept: PHYSICAL THERAPY | Age: 67
End: 2022-06-27
Payer: MEDICARE

## 2022-11-17 NOTE — PROGRESS NOTES
Left message for patient to return call. Does patient want to discuss surgery or follow up with Dr Michael Lynn. PT INITIAL EVALUATION NOTE - [facility] 2-15    Patient Name: Rebecca Amaya  Date:2022  : 1955  [x]  Patient  Verified  Payor: Mae Warner / Plan: Catie Chanel / Product Type: Commerical /    In time:   Out time: 1115  Total Treatment Time (min): 60  Total Timed Codes (min): 60  1:1 Treatment Time ( only): 60  Visit #: 1    Treatment Area: Pain in right hip [M25.551]    SUBJECTIVE  Pain Level (0-10 scale):  0  Any medication changes, allergies to medications, adverse drug reactions, diagnosis change, or new procedure performed?: [] No    [x] Yes (see summary sheet for update)  Subjective:    Patient is a retired 74year old female with complaints of bothersome fecal urgency, high fecal frequency, lightheadedness, weakness sensation prior to BM, mild gas incontinence. She will have periods of time with constipation 3-4 days then will \"dump\" with frequent BMs. She will have periods of time with BMs 2-5 times a day, small amounts rated BSS 3-4-5-6. She reports episodes of strong fecal urgency with loose stools while out shopping and once while out walking. She has not had any FI but is worried she will. She correlates some episodes of IBS-D to stressful events, unable to identify triggering foods. She endorses high levels of worry regarding her bowel function, she is worried about developing fecal incontinence. Pt diagnosed with RA 15+ years ago, managed well with methotrexate. Long history of GERD, underwent hiatal hernia repair     She denies GUALBERTO. She has some urinary hesitation/stuttering. She denies leilani retention. She denies pain with penetrative IC but has not been active several years. PMH:  Hiatal Hernia Fundoplasty 11/10/21  IBS   Mohs surgery for basal cell carcinoma - L eyelid  RA - methotrexate x 15 yrs  2 , uncomplicated.        PLOF: Chronic  Mechanism of Injury: none  Previous Treatment/Compliance:   Work Hx: retired  Living Situation: spouse  Pt Goals: normal bowel function  Barriers: chronicity   Motivation: good  Substance use: none  Cognition: A & O x        OBJECTIVE/EXAMINATION    Posture:  Mild forward head, forward rounded shoulder posture  Other Observations:  BMI WNL   Visible joint changes B hands  Gait and Functional Mobility:  No gait disturbance, no compensatory movement patterns  Palpation:  NT      30  min Self Care/Home Management:  []  See flow sheet :      Patient instructed in the role of physical therapy in the evaluation and treatment of pelvic floor dysfunction, applicable treatment modalities discussed. Expectations for regular home exercise participation and expected visit frequency in to achieve the patient's goals were discussed. Patient instructed in pelvic floor anatomy and function including levator ani, puborectalis and superficial pelvic  musculature. Discussed use of biofeedback for pelvic floor training. Discussed referral to registered dietitian with focus on functional medicine. Pt has had food allergy testing (negative) but has not explored food sensitivities/food relationships to bowel function. Discussed impact of nervous system on bowel function, both constipation and fecal urgency with loose stools. Advised regular nervous system quieting practice (meditation/yoga)      Rationale: increase ROM and improve patient understanding, change daily habits  to improve the patients ability to decrease bothersome fecal urgency            With   [] TE   [] TA   [] Neuro   [x] SC   [] other: Patient Education: [x] Review HEP    [] Progressed/Changed HEP based on:   [] positioning   [] body mechanics   [] transfers   [] heat/ice application    [x] other:  above     Other Objective/Functional Measures:  Pt demonstrated good understanding of all concepts discussed today.       [place] Female Pelvic Floor Questionnaire  Bladder:  4/42  No bother  Bowel: 5/36  Very high bother  Prolapse: 0/15 No bother  Sexual Function: 0/19  No bother         Pain Level (0-10 scale) post treatment:  0     ASSESSMENT/Changes in Function:  Pt with complaints of alternating IBS-C, IBS-D, low levels of pelvic floor dysfunction, high levels of worry regarding fecal continence. Assessment is ongoing. She will benefit from skilled PT services to address her functional limitations and achieve her goals as stated on the plan of care.      [x]  See Plan of Adi Rushing PT, MSPT 2/28/2022 Nsaids Pregnancy And Lactation Text: These medications are considered safe up to 30 weeks gestation. It is excreted in breast milk.

## (undated) DEVICE — NEEDLE HYPO 18GA L1.5IN PNK S STL HUB POLYPR SHLD REG BVL

## (undated) DEVICE — Z DISCONTINUED NO SUB IDED CAPSULE PH GASTROENTEROLOGY ES MON FOR REFLX DEL SYS BRAVO

## (undated) DEVICE — SYRINGE MED 20ML STD CLR PLAS LUERLOCK TIP N CTRL DISP

## (undated) DEVICE — SOLIDIFIER FLUID 3000 CC ABSORB

## (undated) DEVICE — QUILTED PREMIUM COMFORT UNDERPAD,EXTRA HEAVY: Brand: WINGS

## (undated) DEVICE — BAG BELONG PT PERS CLEAR HANDL

## (undated) DEVICE — BW-412T DISP COMBO CLEANING BRUSH: Brand: SINGLE USE COMBINATION CLEANING BRUSH

## (undated) DEVICE — SET ADMIN 16ML TBNG L100IN 2 Y INJ SITE IV PIGGY BK DISP

## (undated) DEVICE — ENDO CARRY-ON PROCEDURE KIT INCLUDES ENZYMATIC SPONGE, GAUZE, BIOHAZARD LABEL, TRAY, LUBRICANT, DIRTY SCOPE LABEL, WATER LABEL, TRAY, DRAWSTRING PAD, AND DEFENDO 4-PIECE KIT.: Brand: ENDO CARRY-ON PROCEDURE KIT

## (undated) DEVICE — CANN NASAL O2 CAPNOGRAPHY AD -- FILTERLINE

## (undated) DEVICE — Device

## (undated) DEVICE — KENDALL RADIOLUCENT FOAM MONITORING ELECTRODE -RECTANGULAR SHAPE: Brand: KENDALL

## (undated) DEVICE — TUBING HYDR IRR --

## (undated) DEVICE — Device: Brand: MEDICAL ACTION INDUSTRIES

## (undated) DEVICE — Z DISCONTINUED NO SUB IDED SET EXTN W/ 4 W STPCOCK M SPIN LOK 36IN

## (undated) DEVICE — Z DISCONTINUED USE 2751540 TUBING IRRIG L10IN DISP PMP ENDOGATOR

## (undated) DEVICE — CATH IV AUTOGRD BC BLU 22GA 25 -- INSYTE

## (undated) DEVICE — SYRINGE 50ML E/T

## (undated) DEVICE — NEONATAL-ADULT SPO2 SENSOR: Brand: NELLCOR

## (undated) DEVICE — AIRLIFE™ U/CONNECT-IT OXYGEN TUBING 7 FEET (2.1 M) CRUSH-RESISTANT OXYGEN TUBING, VINYL TIPPED: Brand: AIRLIFE™

## (undated) DEVICE — 1200 GUARD II KIT W/5MM TUBE W/O VAC TUBE: Brand: GUARDIAN

## (undated) DEVICE — SYR 10ML LUER LOK 1/5ML GRAD --

## (undated) DEVICE — CONNECTOR TBNG AUX H2O JET DISP FOR OLY 160/180 SER

## (undated) DEVICE — CATH REFLX PH Z IMPED 1CH 6.4F -- VERSAFLEX

## (undated) DEVICE — BASIN EMSIS 16OZ GRAPHITE PLAS KID SHP MOLD GRAD FOR ORAL